# Patient Record
Sex: FEMALE | Race: BLACK OR AFRICAN AMERICAN | Employment: FULL TIME | URBAN - METROPOLITAN AREA
[De-identification: names, ages, dates, MRNs, and addresses within clinical notes are randomized per-mention and may not be internally consistent; named-entity substitution may affect disease eponyms.]

---

## 2017-09-24 ENCOUNTER — HOSPITAL ENCOUNTER (EMERGENCY)
Age: 27
Discharge: HOME | End: 2017-09-24
Attending: EMERGENCY MEDICINE | Admitting: EMERGENCY MEDICINE
Payer: MEDICARE

## 2017-09-24 VITALS
DIASTOLIC BLOOD PRESSURE: 84 MMHG | RESPIRATION RATE: 16 BRPM | BODY MASS INDEX: 17.89 KG/M2 | TEMPERATURE: 99.3 F | HEART RATE: 116 BPM | SYSTOLIC BLOOD PRESSURE: 124 MMHG | HEIGHT: 67 IN | WEIGHT: 114 LBS | OXYGEN SATURATION: 100 %

## 2017-09-24 DIAGNOSIS — N93.9 ABNORMAL VAGINAL BLEEDING: Primary | ICD-10-CM

## 2017-09-24 DIAGNOSIS — B96.89 BV (BACTERIAL VAGINOSIS): ICD-10-CM

## 2017-09-24 DIAGNOSIS — N76.0 BV (BACTERIAL VAGINOSIS): ICD-10-CM

## 2017-09-24 LAB
BASOPHILS # BLD: 0 K/UL (ref 0–0.06)
BASOPHILS NFR BLD: 0 % (ref 0–2)
DIFFERENTIAL METHOD BLD: ABNORMAL
EOSINOPHIL # BLD: 0.1 K/UL (ref 0–0.4)
EOSINOPHIL NFR BLD: 1 % (ref 0–5)
ERYTHROCYTE [DISTWIDTH] IN BLOOD BY AUTOMATED COUNT: 15.2 % (ref 11.6–14.5)
HCT VFR BLD AUTO: 40 % (ref 35–45)
HGB BLD-MCNC: 12.9 G/DL (ref 12–16)
LYMPHOCYTES # BLD: 1.1 K/UL (ref 0.9–3.6)
LYMPHOCYTES NFR BLD: 12 % (ref 21–52)
MCH RBC QN AUTO: 26.1 PG (ref 24–34)
MCHC RBC AUTO-ENTMCNC: 32.3 G/DL (ref 31–37)
MCV RBC AUTO: 80.8 FL (ref 74–97)
MONOCYTES # BLD: 0.7 K/UL (ref 0.05–1.2)
MONOCYTES NFR BLD: 8 % (ref 3–10)
NEUTS SEG # BLD: 6.8 K/UL (ref 1.8–8)
NEUTS SEG NFR BLD: 79 % (ref 40–73)
PLATELET # BLD AUTO: 246 K/UL (ref 135–420)
PMV BLD AUTO: 10.3 FL (ref 9.2–11.8)
RBC # BLD AUTO: 4.95 M/UL (ref 4.2–5.3)
SERVICE CMNT-IMP: NORMAL
WBC # BLD AUTO: 8.6 K/UL (ref 4.6–13.2)
WET PREP GENITAL: NORMAL

## 2017-09-24 PROCEDURE — 85025 COMPLETE CBC W/AUTO DIFF WBC: CPT

## 2017-09-24 PROCEDURE — 87491 CHLMYD TRACH DNA AMP PROBE: CPT

## 2017-09-24 PROCEDURE — 87210 SMEAR WET MOUNT SALINE/INK: CPT

## 2017-09-24 PROCEDURE — 99283 EMERGENCY DEPT VISIT LOW MDM: CPT

## 2017-09-24 RX ORDER — METRONIDAZOLE 500 MG/1
500 TABLET ORAL 2 TIMES DAILY
Qty: 14 TAB | Refills: 0 | Status: SHIPPED | OUTPATIENT
Start: 2017-09-24 | End: 2017-10-01

## 2017-09-24 NOTE — ED NOTES
Thang Stephenson is a 27 y.o. female that was discharged in stable condition. The patients diagnosis, condition and treatment were explained to  patient and aftercare instructions were given. The patient verbalized understanding. Patient armband removed and shredded.

## 2017-09-24 NOTE — ED TRIAGE NOTES
Pt presents to the ED with heavy menstrual cycle onset 08/02/2017. Pt reports usually normal in cycle and flow. Pt states concerns due to heavy prolonged bleeding. Pt states left ear pain onset today, states pressure and throbbing.

## 2017-09-24 NOTE — DISCHARGE INSTRUCTIONS
Abnormal Uterine Bleeding: Care Instructions  Your Care Instructions  Abnormal uterine bleeding (AUB) is irregular bleeding from the uterus that is longer or heavier than usual or does not occur at your regular time. Sometimes it is caused by changes in hormone levels. It can also be caused by growths in the uterus, such as fibroids or polyps. Sometimes a cause cannot be found. You may have heavy bleeding when you are not expecting your period. Your doctor may suggest a pregnancy test, if you think you are pregnant. Follow-up care is a key part of your treatment and safety. Be sure to make and go to all appointments, and call your doctor if you are having problems. It's also a good idea to know your test results and keep a list of the medicines you take. How can you care for yourself at home? · Be safe with medicines. Take pain medicines exactly as directed. ¨ If the doctor gave you a prescription medicine for pain, take it as prescribed. ¨ If you are not taking a prescription pain medicine, ask your doctor if you can take an over-the-counter medicine. · You may be low in iron because of blood loss. Eat a balanced diet that is high in iron and vitamin C. Foods rich in iron include red meat, shellfish, eggs, beans, and leafy green vegetables. Talk to your doctor about whether you need to take iron pills or a multivitamin. When should you call for help? Call 911 anytime you think you may need emergency care. For example, call if:  · You passed out (lost consciousness). Call your doctor now or seek immediate medical care if:  · You have sudden, severe pain in your belly or pelvis. · You have severe vaginal bleeding. You are soaking through your usual pads or tampons every hour for 2 or more hours. · You feel dizzy or lightheaded, or you feel like you may faint. Watch closely for changes in your health, and be sure to contact your doctor if:  · You have new belly or pelvic pain.   · You have a fever.  · Your bleeding gets worse or lasts longer than 1 week. · You think you may be pregnant. Where can you learn more? Go to http://oswaldo-brittany.info/. Enter K731 in the search box to learn more about \"Abnormal Uterine Bleeding: Care Instructions. \"  Current as of: October 13, 2016  Content Version: 11.3  © 4755-1363 [employer]. Care instructions adapted under license by [employer] (which disclaims liability or warranty for this information). If you have questions about a medical condition or this instruction, always ask your healthcare professional. [place] [address] any warranty or liability for your use of this information.

## 2017-09-24 NOTE — ED PROVIDER NOTES
Patient is a 27 y.o. female presenting with vaginal bleeding and ear pain. Vaginal Bleeding     Ear Pain       Thang Stephenson is a 27 y.o. female GIP1 presents with vaginal bleeding on/off for the past 1 month averaging around 2 pads daily not completely saturated. Normal cycle last around 5 days . Denies of any vaginal pain, urinary sx, fever, chills, dizziness, chest pain, headache, SOB or diaphoresis. Past Surgical History:   Procedure Laterality Date    HX CYST REMOVAL      right breast    HX CYST REMOVAL           History reviewed. No pertinent family history. Social History     Social History    Marital status: SINGLE     Spouse name: N/A    Number of children: N/A    Years of education: N/A     Occupational History    Not on file. Social History Main Topics    Smoking status: Never Smoker    Smokeless tobacco: Never Used    Alcohol use No    Drug use: No    Sexual activity: Yes     Partners: Male     Birth control/ protection: None     Other Topics Concern    Not on file     Social History Narrative         ALLERGIES: Review of patient's allergies indicates no known allergies. Review of Systems   Constitutional: Negative. HENT: Positive for ear pain. Eyes: Negative. Respiratory: Negative. Cardiovascular: Negative. Gastrointestinal: Negative. Endocrine: Negative. Genitourinary: Positive for vaginal bleeding. Musculoskeletal: Negative. Allergic/Immunologic: Negative. Neurological: Negative. Hematological: Negative. Psychiatric/Behavioral: Negative. Vitals:    09/24/17 1424 09/24/17 1432 09/24/17 1433 09/24/17 1443   BP: 122/59 124/84     Pulse: (!) 116      Resp: 16      Temp: 99.3 °F (37.4 °C)      SpO2: 100%  100% 100%   Weight: 51.7 kg (114 lb)      Height: 5' 7\" (1.702 m)               Physical Exam   Constitutional: She is oriented to person, place, and time. She appears well-developed and well-nourished.    HENT:   Head: Normocephalic and atraumatic. Eyes: Conjunctivae and EOM are normal. Pupils are equal, round, and reactive to light. Neck: Normal range of motion. Cardiovascular: Normal rate, regular rhythm and normal heart sounds. Pulmonary/Chest: Effort normal and breath sounds normal. No respiratory distress. She has no wheezes. She has no rales. She exhibits no tenderness. Abdominal: Soft. Bowel sounds are normal. She exhibits no distension. There is no tenderness. There is no rebound and no guarding. Neurological: She is alert and oriented to person, place, and time. Skin: Skin is warm. She is not diaphoretic. Psychiatric: She has a normal mood and affect. Her behavior is normal. Judgment and thought content normal.        MDM  Number of Diagnoses or Management Options  Abnormal vaginal bleeding:     ED Course       Pelvic Exam  Date/Time: 9/24/2017 4:35 PM  Performed by: PA  Type of exam performed: bimanual and speculum. External genitalia appearance: normal.    Vaginal exam:  normal.    Cervical exam:  normal.    Specimen(s) collected:  chlamydia, GC and vaginal culture. Bimanual exam:  normal.    Patient tolerance: Patient tolerated the procedure well with no immediate complications          Recent Results (from the past 12 hour(s))   CBC WITH AUTOMATED DIFF    Collection Time: 09/24/17  3:30 PM   Result Value Ref Range    WBC 8.6 4.6 - 13.2 K/uL    RBC 4.95 4.20 - 5.30 M/uL    HGB 12.9 12.0 - 16.0 g/dL    HCT 40.0 35.0 - 45.0 %    MCV 80.8 74.0 - 97.0 FL    MCH 26.1 24.0 - 34.0 PG    MCHC 32.3 31.0 - 37.0 g/dL    RDW 15.2 (H) 11.6 - 14.5 %    PLATELET 679 658 - 121 K/uL    MPV 10.3 9.2 - 11.8 FL    NEUTROPHILS 79 (H) 40 - 73 %    LYMPHOCYTES 12 (L) 21 - 52 %    MONOCYTES 8 3 - 10 %    EOSINOPHILS 1 0 - 5 %    BASOPHILS 0 0 - 2 %    ABS. NEUTROPHILS 6.8 1.8 - 8.0 K/UL    ABS. LYMPHOCYTES 1.1 0.9 - 3.6 K/UL    ABS. MONOCYTES 0.7 0.05 - 1.2 K/UL    ABS. EOSINOPHILS 0.1 0.0 - 0.4 K/UL    ABS.  BASOPHILS 0.0 0.0 - 0.06 K/UL    DF AUTOMATED     WET PREP    Collection Time: 09/24/17  4:00 PM   Result Value Ref Range    Special Requests: NO SPECIAL REQUESTS      Wet prep FEW  CLUE CELLS PRESENT        Wet prep NO TRICHOMONAS SEEN      Wet prep NO YEAST SEEN             DISCHARGE NOTE:  5:50 PM    Marjorie Hester's  results have been reviewed with her. She has been counseled regarding her diagnosis, treatment, and plan. She verbally conveys understanding and agreement of the signs, symptoms, diagnosis, treatment and prognosis and additionally agrees to follow up as discussed. She also agrees with the care-plan and conveys that all of her questions have been answered. I have also provided discharge instructions for her that include: educational information regarding their diagnosis and treatment, and list of reasons why they would want to return to the ED prior to their follow-up appointment, should her condition change. CLINICAL IMPRESSION:    1. Abnormal vaginal bleeding    2. BV (bacterial vaginosis)        AFTER VISIT PLAN:    Current Discharge Medication List      START taking these medications    Details   metroNIDAZOLE (FLAGYL) 500 mg tablet Take 1 Tab by mouth two (2) times a day for 7 days. Qty: 14 Tab, Refills: 0              Follow-up Information     Follow up With Details Comments Contact Info    Ignacia Sheppard MD In 2 days      [facility]  If symptoms worsen [id number] [facility]  [phone]    Gyn    please follow up with your Gyn as part of ER follow up.             Written by Bishop Charity HAM

## 2017-09-25 LAB
C TRACH RRNA SPEC QL NAA+PROBE: POSITIVE
N GONORRHOEA RRNA SPEC QL NAA+PROBE: POSITIVE
SPECIMEN SOURCE: ABNORMAL

## 2017-09-29 NOTE — ED NOTES
Called patient, states she was informed by health department of + gonorrhea and chlamydia results and that she has an appointment to get treated for these and tested for more diseases there.